# Patient Record
(demographics unavailable — no encounter records)

---

## 2025-07-24 NOTE — HISTORY OF PRESENT ILLNESS
[FreeTextEntry1] : New CPE [de-identified] : 26 year old F presenting to establish care and for annual physical.  Having some menstrual irregularity recently  Yeast infections - 3 in the past 2 months - 6 in the past 6 months - using monostat OTC or diflucan from urgent care - last GYN exam was December and had pap smear.   PSHx:  None  Hospitalizations/ED visits: None   Current Medications: OCP    Allergies: None  Social Hx: Alcohol: Socially  Tobacco: None Drugs: None Sexual activity: Monogamous with male partner. Yes to STD screen.  Diet: Healthy  Exercise: Good  Mood: Good Lives with: Roommates  Work: SW    Family Hx: Mother: Thyroid issue Grandmother with HTN Both grandmothers with breast cancer - both BRCA negative  Cancer screenings:  Mammograms start at 40 Colon cancer screening 45 Getting pap smears at GYN  Immunizations: UTD including HPV

## 2025-07-24 NOTE — HISTORY OF PRESENT ILLNESS
[FreeTextEntry1] : New CPE [de-identified] : 26 year old F presenting to establish care and for annual physical.  Having some menstrual irregularity recently  Yeast infections - 3 in the past 2 months - 6 in the past 6 months - using monostat OTC or diflucan from urgent care - last GYN exam was December and had pap smear.   PSHx:  None  Hospitalizations/ED visits: None   Current Medications: OCP    Allergies: None  Social Hx: Alcohol: Socially  Tobacco: None Drugs: None Sexual activity: Monogamous with male partner. Yes to STD screen.  Diet: Healthy  Exercise: Good  Mood: Good Lives with: Roommates  Work: SW    Family Hx: Mother: Thyroid issue Grandmother with HTN Both grandmothers with breast cancer - both BRCA negative  Cancer screenings:  Mammograms start at 40 Colon cancer screening 45 Getting pap smears at GYN  Immunizations: UTD including HPV

## 2025-07-24 NOTE — ASSESSMENT
[FreeTextEntry1] :  I have spent 30 minutes on the encounter which excludes time spent on the preventive service

## 2025-07-24 NOTE — REVIEW OF SYSTEMS
[Fever] : no fever [Chest Pain] : no chest pain [Palpitations] : no palpitations [Lower Ext Edema] : no lower extremity edema [Shortness Of Breath] : no shortness of breath [Cough] : no cough [Dyspnea on Exertion] : no dyspnea on exertion [Abdominal Pain] : no abdominal pain [Nausea] : no nausea [Constipation] : no constipation [Diarrhea] : diarrhea [Vomiting] : no vomiting [Dysuria] : no dysuria [Vaginal Discharge] : vaginal discharge [Joint Pain] : no joint pain [Itching] : no itching